# Patient Record
Sex: FEMALE | Race: BLACK OR AFRICAN AMERICAN | Employment: FULL TIME | URBAN - METROPOLITAN AREA
[De-identification: names, ages, dates, MRNs, and addresses within clinical notes are randomized per-mention and may not be internally consistent; named-entity substitution may affect disease eponyms.]

---

## 2022-06-03 ENCOUNTER — HOSPITAL ENCOUNTER (OUTPATIENT)
Dept: PHYSICAL THERAPY | Age: 57
Discharge: HOME | End: 2022-06-03
Payer: MEDICAID

## 2022-06-03 PROCEDURE — 97162 PT EVAL MOD COMPLEX 30 MIN: CPT | Performed by: PHYSICAL THERAPIST
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
## 2022-06-08 ENCOUNTER — HOSPITAL ENCOUNTER (OUTPATIENT)
Dept: PHYSICAL THERAPY | Age: 57
Discharge: HOME | End: 2022-06-08
Payer: MEDICAID

## 2022-06-08 PROCEDURE — 97112 NEUROMUSCULAR REEDUCATION: CPT

## 2022-06-08 PROCEDURE — 97110 THERAPEUTIC EXERCISES: CPT

## 2022-06-08 PROCEDURE — 97530 THERAPEUTIC ACTIVITIES: CPT

## 2022-06-08 PROCEDURE — 97116 GAIT TRAINING THERAPY: CPT
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
## 2022-06-10 ENCOUNTER — HOSPITAL ENCOUNTER (OUTPATIENT)
Dept: PHYSICAL THERAPY | Age: 57
Discharge: HOME | End: 2022-06-10
Payer: MEDICAID

## 2022-06-10 PROCEDURE — 97112 NEUROMUSCULAR REEDUCATION: CPT

## 2022-06-10 PROCEDURE — 97116 GAIT TRAINING THERAPY: CPT

## 2022-06-10 PROCEDURE — 97530 THERAPEUTIC ACTIVITIES: CPT

## 2022-06-10 PROCEDURE — 97110 THERAPEUTIC EXERCISES: CPT

## 2022-06-10 NOTE — PROGRESS NOTES
PT DAILY TREATMENT NOTE     Patient Name: Bahman Decker  Date:6/10/2022  : 1965  [x]  Patient  Verified  Payor: Milli Hernandez / Plan: 1 [facility] 270 / Product Type: Managed Care Medicaid /    In time: 1024   Out time:1020  Total Treatment Time (min): 52  Visit #: 3 of     Treatment Area: Cervical muscle pain [M54.2]  Other low back pain [M54.59]    SUBJECTIVE  Pain Level (0-10 scale): 4/10  Any medication changes, allergies to medications, adverse drug reactions, diagnosis change, or new procedure performed?: [x] No    [] Yes (see summary sheet for update)  Subjective functional status/changes:   [] No changes reported  \" I feel pretty good today\". Pt denies any complications following last session. Notes that she was sore x 1 day \"the normal soreness\".      OBJECTIVE  Modality rationale: PD today    Min Type Additional Details    [] Estim: []Att   []Unatt  []TENS instruct                 []IFC  []Premod []NMES                       []Other:  []w/US   []w/ice   []w/heat  Position:  Location:    []  Traction: [] Cervical       []Lumbar                       [] Prone          []Supine                       []Intermittent   []Continuous Lbs:  [] before manual  [] after manual    []  Ultrasound: []Continuous   [] Pulsed                           []1MHz   []3MHz Location:  W/cm2:    []  Iontophoresis with dexamethasone         Location: [] Take home patch   [] In clinic    []  Ice     []  heat  []  Ice massage Position:  Location:    []  Vasopneumatic Device  If using vaso (only need to measure limb vaso being performed on)      pre-treatment girth :       post-treatment girth :       measured at (landmark location) : Pressure: [] lo [] med [] hi   Temp: [] lo [] med [] hi   [] Skin assessment post-treatment:  []intact []redness- no adverse reaction       []redness - adverse reaction:     10 min Therapeutic Exercise:  Per FS   Rationale: increase ROM, increase strength, improve coordination, improve balance, and increase proprioception to improve the patients ability to progress to functional activities limited by pain or other dysfunction     12 min Therapeutic Activity:  Per FS    Rationale: to improve functional activities, model real life movements and performance specific to the patients need with supervision to return the patient to their PLOF      10 min Neuromuscular Re-education:  Per fs   Rationale: exercises designed to improve and/or maintain balance, coordination, kinesthetic sense, posture, and/or proprioception for functional activities to improve the patients ability to function in daily life    15 min Gait Training:        Increased time sec to slow movement and seated rest breaks , VCing to abdominal draw and posture    50 feet x 2 of following-  [] March            [x] Lateral                   [] Horizontal HT  [] Tandem         [] Banded Lateral     [] Vertical HT  [x] Retrograde/ SBA     [x]  Monster (NO band)   [] Dual Task  [] Hurdles          [] [place]                    [] Ladder Drills  [] Heel Walk      [] Toe Walk   Rationale: improve safety and dynamic movement with household/community ambulation. X throughout treatment min Patient Education: [x] Review HEP , importance of HEP compliance daily to address ROM/mobility (pt reported \"not wanting to overdo it), PT reviewed ROM vs strength and how ROM activity is important to do on regular basis. Other Objective/Functional Measures:     HEP - \"I am doing them now 2x/week\"  Cervical spine: R rotation:60 d ;L rotation: 65 d; R SB:  35d; L SB: 40 d  Progressions: BTB to rows  Additions: TB extensions, palloff press    Pain Level (0-10 scale) post treatment: 4/10 \" I am feeling good\"    ASSESSMENT/Changes in Function:   Pt noted to have good tolerance to activity progressions. 75% v.c. needed for scapular setting w/ UE movements.  Pt is making good progress towards all goals at this time. Demo improved postural awareness w/ sitting, self correcting FHP prior to PT cues this session. Encouraged greater performance of HEP between sessions. Pt verbalized understanding and gratitude for PT tx. Pt deferred modalities this tx. Patient will continue to benefit from skilled PT services to modify and progress therapeutic interventions, address functional mobility deficits, address ROM deficits, address strength deficits, analyze and address soft tissue restrictions, analyze and cue movement patterns, analyze and modify body mechanics/ergonomics, assess and modify postural abnormalities, address imbalance/dizziness, and instruct in home and community integration to attain remaining goals. []  See Plan of Care  []  See progress note/recertification  []  See Discharge Summary         Progress towards goals / Updated goals:  First FU session - will cont per goals as below 6/8/2022    · Short Term Goals: To be accomplished in  2-3  weeks:  1. Patient will be compliant with initial HEP for sx management to address the above listed deficits. Current: 6/10/22: Goal progressing -2x/wk   2. Patient to report pain <=to 5 at worst to facilitate sleep, walking and ADL endurance. Current: 6/10/22: goal progressing; \"getting better\" 5-6/10  · Long Term Goals: To be accomplished in  10-12  treatments:  1. Patient to be independent & compliant with HEP in preparation for D/C.   2. Patient to increase FOTO score to 86 indicating improved functional abilities and QOL. 3. Patient to increase c/s L rot to 60deg, and Arslan SB to 40deg to facilitate driving and indicate improved mm tension. Current: 6/10/22: goal progressing: R rotation:60 d ;L rotation: 65 d; R SB:  35d; L SB: 40 d  4. Patient able to perform a full bridge indicating improved core strength for l/s stability. Current: 6/10/22: 25% at this time, denies pain, \"just weak\"  5.  Patient to increase l/s AROM to 75% globally to facilitate ADLS and functional mobility.        PLAN  [x]  Upgrade activities as tolerated     []  Continue plan of care  []  Update interventions per flow sheet       []  Discharge due to:_  [x]  Other:_  Cont to progress ROM/core stabilization as pt tolerates    The Starr PT 6/10/2022          Future Appointments   Date Time Provider Tessy Al   6/10/2022  9:30 AM [facility]   6/13/2022 10:00 AM [facility] PT [facility]   6/15/2022 10:00 AM [facility] PT [facility]   6/20/2022  7:45 AM Prema ZARATE PT [facility]   6/23/2022 10:00 AM [facility] PT [facility]   6/27/2022  9:15 AM [facility] PT [facility]   6/29/2022  8:30 AM [facility] PT [facility]   9/13/2022 10:30 AM [facility] NURSE [facility]

## 2022-06-13 ENCOUNTER — HOSPITAL ENCOUNTER (OUTPATIENT)
Dept: PHYSICAL THERAPY | Age: 57
Discharge: HOME | End: 2022-06-13
Payer: MEDICAID

## 2022-06-13 PROCEDURE — 97110 THERAPEUTIC EXERCISES: CPT

## 2022-06-13 PROCEDURE — 97530 THERAPEUTIC ACTIVITIES: CPT

## 2022-06-13 PROCEDURE — 97112 NEUROMUSCULAR REEDUCATION: CPT

## 2022-06-13 PROCEDURE — 97116 GAIT TRAINING THERAPY: CPT

## 2022-06-13 NOTE — PROGRESS NOTES
PT DAILY TREATMENT NOTE     Patient Name: Barbaraann Siemens  Date:2022  : 1965  [x]  Patient  Verified  Payor: [employer] / Plan: 1 [facility] 270 / Product Type: Managed Care Medicaid /    In time: 618 Out time:924  Total Treatment Time (min): 54  Visit #: 4 of 10-12    Treatment Area: Cervical muscle pain [M54.2]  Other low back pain [M54.59]    SUBJECTIVE  Pain Level (0-10 scale): 510  Any medication changes, allergies to medications, adverse drug reactions, diagnosis change, or new procedure performed?: [x] No    [] Yes (see summary sheet for update)  Subjective functional status/changes:   [] No changes reported  Patient reports doing well     OBJECTIVE  Modality rationale: PD today    Min Type Additional Details    [] Estim: []Att   []Unatt  []TENS instruct                 []IFC  []Premod []NMES                       []Other:  []w/US   []w/ice   []w/heat  Position:  Location:    []  Traction: [] Cervical       []Lumbar                       [] Prone          []Supine                       []Intermittent   []Continuous Lbs:  [] before manual  [] after manual    []  Ultrasound: []Continuous   [] Pulsed                           []1MHz   []3MHz Location:  W/cm2:    []  Iontophoresis with dexamethasone         Location: [] Take home patch   [] In clinic    []  Ice     []  heat  []  Ice massage Position:  Location:    []  Vasopneumatic Device  If using vaso (only need to measure limb vaso being performed on)      pre-treatment girth :       post-treatment girth :       measured at (landmark location) : Pressure: [] lo [] med [] hi   Temp: [] lo [] med [] hi   [x] Skin assessment post-treatment:  [x]intact []redness- no adverse reaction       []redness - adverse reaction:     20 min Therapeutic Exercise:  Per FS   Rationale: increase ROM, increase strength, improve coordination, improve balance, and increase proprioception to improve the patients ability to progress to functional activities limited by pain or other dysfunction     16 min Therapeutic Activity:  Per FS    Rationale: to improve functional activities, model real life movements and performance specific to the patients need with supervision to return the patient to their PLOF      10 min Neuromuscular Re-education:  Per FS   Rationale: exercises designed to improve and/or maintain balance, coordination, kinesthetic sense, posture, and/or proprioception for functional activities to improve the patients ability to function in daily life    8 min Gait Training:        Increased time sec to slow movement and seated rest breaks , VCing to abdominal draw and posture     50 feet x 2 of following-  [x] March            [x] Lateral                   [] Horizontal HT  [] Tandem         [] Banded Lateral     [] Vertical HT  [x] Retrograde/ SBA     [x]  Monster (NO band)   [] Dual Task  [] Hurdles          [] Fransisca Littler                    [] Ladder Drills  [] Heel Walk      [] Toe Walk   Rationale: improve safety and dynamic movement with household/community ambulation. X throughout treatment min Patient Education: [x] Review HEP , bridge, TB B shoulder ER with GTB       Other Objective/Functional Measures:     LS AROM : flexion - mid shin , extension - dec 25%, SB - dec 25% B (proximal knee joint)  TA  Progression - added march to TA draw  Bridge - dec 75%    GH/ posture progression - supine TB ER     Pain Level (0-10 scale) post treatment: 4 \"just my normal pain\"    ASSESSMENT/Changes in Function:   Pt demo slight improvement in LS AROM compared to IE.  LS lordosis is still prevalent with decreased reversal.  Patient did not require UE to return to standing after LS flexion indicating increased LS strength. Significant strength deficits of posterior chain strength as indicated by poor glut bridge AROM.   Patient progressing well overlal        Patient will continue to benefit from skilled PT services to modify and progress therapeutic interventions, address functional mobility deficits, address ROM deficits, address strength deficits, analyze and address soft tissue restrictions, analyze and cue movement patterns, analyze and modify body mechanics/ergonomics, assess and modify postural abnormalities, address imbalance/dizziness, and instruct in home and community integration to attain remaining goals. [x]  See Plan of Care  []  See progress note/recertification  []  See Discharge Summary         Progress towards goals / Updated goals:  PN due 7/3  First FU session - will cont per goals as below 6/8/2022    · Short Term Goals: To be accomplished in  2-3  weeks:  1. Patient will be compliant with initial HEP for sx management to address the above listed deficits. Current: 6/10/22: Goal progressing -2x/wk      2. Patient to report pain <=to 5 at worst to facilitate sleep, walking and ADL endurance. Current: 6/10/22: goal progressing; \"getting better\" 5-6/10    · Long Term Goals: To be accomplished in  10-12  treatments:  1. Patient to be independent & compliant with HEP in preparation for D/C.   2. Patient to increase FOTO score to 86 indicating improved functional abilities and QOL. 3. Patient to increase c/s L rot to 60deg, and Arslan SB to 40deg to facilitate driving and indicate improved mm tension. Current: 6/10/22: goal progressing: R rotation:60 d ;L rotation: 65 d; R SB:  35d; L SB: 40 d    4. Patient able to perform a full bridge indicating improved core strength for l/s stability. Current: 6/10/22: 25% at this time, denies pain, \"just weak\"    5. Patient to increase l/s AROM to 75% globally to facilitate ADLS and functional mobility.    Current:  Goal progressing well - improvements noted in SB and extension since IE.  6/13/2022      PLAN  [x]  Upgrade activities as tolerated     []  Continue plan of care  []  Update interventions per flow sheet       []  Discharge due to:_  [x]  Other:_ cont core strength Deidra Verde, PT 6/13/2022          Future Appointments   Date Time Provider Tessy Al   6/13/2022  8:30 AM Kaya Muhammad, PT [facility]   6/15/2022 10:00 AM [facility] PT [place] 2 [facility]   6/20/2022  7:45 AM Rina ZARATE, PT [facility]   6/23/2022 10:00 AM [facility] PT [place] 2 [facility]   6/27/2022  9:15 AM [facility] PT [place] 2 [facility]   6/29/2022  8:30 AM [facility] PT [place] 2 [facility]   9/13/2022 10:30 AM [facility] NUR[facility] BS AMB

## 2022-06-15 ENCOUNTER — HOSPITAL ENCOUNTER (OUTPATIENT)
Dept: PHYSICAL THERAPY | Age: 57
Discharge: HOME | End: 2022-06-15
Payer: MEDICAID

## 2022-06-15 PROCEDURE — 97112 NEUROMUSCULAR REEDUCATION: CPT

## 2022-06-15 PROCEDURE — 97116 GAIT TRAINING THERAPY: CPT

## 2022-06-15 PROCEDURE — 97530 THERAPEUTIC ACTIVITIES: CPT

## 2022-06-15 PROCEDURE — 97110 THERAPEUTIC EXERCISES: CPT

## 2022-06-15 NOTE — PROGRESS NOTES
PT DAILY TREATMENT NOTE     Patient Name: Izora Osgood  Date:6/15/2022  : 1965  [x]  Patient  Verified  Payor: Yogesh Robbdilcia / Plan: 1 [place] [id number] / Product Type: Managed Care Medicaid /    In time: 1000 Out time:1051  Total Treatment Time (min): 51  Visit #: 5 of 10-12    Treatment Area: Cervical muscle pain [M54.2]  Other low back pain [M54.59]    SUBJECTIVE  Pain Level (0-10 scale): 4/10 t/s and l/s pain, 6/10 R great toe pain  Any medication changes, allergies to medications, adverse drug reactions, diagnosis change, or new procedure performed?: [x] No    [] Yes (see summary sheet for update)  Subjective functional status/changes:   [] No changes reported  Pt reports that she has been doing well, no problems w/ the last session. However, noted after the last session in the evening, had increased R great toe pain, difficulty w/ mobility of that toe and some mild swelling. Pt has used ice prn and took Tylenol prior to therapy to reduce overall S&S. Noted cont soreness but improved ability to walk. However, notes that back and upper back pain are improving. Pt denies any changes to activity, h/o Gout, or dropping anything on her toe.     OBJECTIVE  Modality rationale: PD today    Min Type Additional Details    [] Estim: []Att   []Unatt  []TENS instruct                 []IFC  []Premod []NMES                       []Other:  []w/US   []w/ice   []w/heat  Position:  Location:    []  Traction: [] Cervical       []Lumbar                       [] Prone          []Supine                       []Intermittent   []Continuous Lbs:  [] before manual  [] after manual    []  Ultrasound: []Continuous   [] Pulsed                           []1MHz   []3MHz Location:  W/cm2:    []  Iontophoresis with dexamethasone         Location: [] Take home patch   [] In clinic    []  Ice     []  heat  []  Ice massage Position:  Location:    []  Vasopneumatic Device  If using vaso (only need to measure limb vaso being performed on)      pre-treatment girth :       post-treatment girth :       measured at (landmark location) : Pressure: [] lo [] med [] hi   Temp: [] lo [] med [] hi   [] Skin assessment post-treatment:  []intact []redness- no adverse reaction       []redness - adverse reaction:     20 min Therapeutic Exercise:  Per FS   Rationale: increase ROM, increase strength, improve coordination, improve balance, and increase proprioception to improve the patients ability to progress to functional activities limited by pain or other dysfunction     11 min Therapeutic Activity:  Per FS    Rationale: to improve functional activities, model real life movements and performance specific to the patients need with supervision to return the patient to their PLOF      10 min Neuromuscular Re-education:  Per FS   Rationale: exercises designed to improve and/or maintain balance, coordination, kinesthetic sense, posture, and/or proprioception for functional activities to improve the patients ability to function in daily life    10 min Gait Training:        Increased time sec to slow movement and seated rest breaks , VCing to abdominal draw and posture     50 feet x 2 of following-  [x] March            [x] Lateral                   [] Horizontal HT  [] Tandem         [] Banded Lateral     [] Vertical HT  [] Retrograde/ SBA     [x]  Monster (NO band)   [] Dual Task  [] Hurdles          [x] Parisa Siddiqi                    [] Ladder Drills  [] Heel Walk      [] Toe Walk   Rationale: improve safety and dynamic movement with household/community ambulation.             X throughout treatment min Patient Education: [x] Review HEP, controlled breathing w/ bridging activity     Other Objective/Functional Measures:   + noted R great toe pain today that started the evening of the last session, unable to recall any pain/discomfort during the session and denies any trauma/h/o pain or gout  + agreeable to current program. PT closely monitoring for any adverse response to exercises in foot or back/t/s  + added: jamaljl tolentino, seated SB 3-way for l/s and t/s ROM  + pt noted to maintain breath holds during lift portion of bridge, reviewed breathing out w/ effort. Pt noted improved ease of movement w/ breathing technique  + progressed to 2x10 TrA hold w/ marching  Great toe:   Circumference: R: 8.0 cm   L:  7.5 cm (base of great toe)         R:   21.5 cm             L: 21.5 cm (forefoot)   Flex: R: 20 d w/ pain dorsal aspect of foot  L:45 d   Ext: R:   45 d      L:  50 d  + NO CHANGES IN COLOR OR TEMPERATURE OF THE RIGHT GREAT TOE/FOREFOOT    Pain Level (0-10 scale) post treatment: 3/10 R great toe, 4/10 LB and t/s \" My back is feeling good and my toe is better\"     ASSESSMENT/Changes in Function:   Pt noted to have good tolerance to activities performed during session today without any increased S&S. Held retro walking to avoid increased ext at the R great toe w/ toe to heel sequence during this task. Noted to require intermittent v.c. for breathing throughout exercises and for TrA bracing. Pt w/ good recall w/ therapists cueing, of correct activity performance. Noted to have cont weakness in the core and proximal hips and would benefit from cont progression of activity as appropriate. Of note, pt w/ R great toe pain w/ increased base of great toe circumference on the R as well as decreased AROM of the R toe w/ pain into flex. No h/o gout or trauma or pain. Will cont to monitor. Pt denies any knee pain any other location.        Patient will continue to benefit from skilled PT services to modify and progress therapeutic interventions, address functional mobility deficits, address ROM deficits, address strength deficits, analyze and address soft tissue restrictions, analyze and cue movement patterns, analyze and modify body mechanics/ergonomics, assess and modify postural abnormalities, address imbalance/dizziness, and instruct in home and community integration to attain remaining goals. [x]  See Plan of Care  []  See progress note/recertification  []  See Discharge Summary         Progress towards goals / Updated goals:  PN due 7/3  First FU session - will cont per goals as below 6/8/2022    · Short Term Goals: To be accomplished in  2-3  weeks:  1. Patient will be compliant with initial HEP for sx management to address the above listed deficits. Current: 6/10/22: Goal progressing -2x/wk      2. Patient to report pain <=to 5 at worst to facilitate sleep, walking and ADL endurance. Current: 6/10/22: goal progressing; \"getting better\" 5-6/10    · Long Term Goals: To be accomplished in  10-12  treatments:  1. Patient to be independent & compliant with HEP in preparation for D/C.   2. Patient to increase FOTO score to 86 indicating improved functional abilities and QOL. 3. Patient to increase c/s L rot to 60deg, and Arslan SB to 40deg to facilitate driving and indicate improved mm tension. Current: 6/10/22: goal progressing: R rotation:60 d ;L rotation: 65 d; R SB:  35d; L SB: 40 d    4. Patient able to perform a full bridge indicating improved core strength for l/s stability. Current: 6/10/22: 25% at this time, denies pain, \"just weak\"    5. Patient to increase l/s AROM to 75% globally to facilitate ADLS and functional mobility.    Current:  Goal progressing well - improvements noted in SB and extension since IE.  6/13/2022      PLAN  [x]  Upgrade activities as tolerated     []  Continue plan of care  []  Update interventions per flow sheet       []  Discharge due to:_  [x]  Other:_ cont core strength, f/u on R great toe pain (measurements) 2/2 pt without h/o gout but recent onset pain in the R great toe    The Starr, PT 6/15/2022          Future Appointments   Date Time Provider Tessy Al   6/15/2022 10:00 AM [facility] PT [place] [facility]   6/20/2022  7:45 AM Rocío Cm PT [facility]   6/23/2022 10:00 AM [facility] PT [place] 2 [facility] 6/27/2022  9:15 AM [facility] PT [place] 2 [facility] [id number]   6/29/2022  8:30 AM [facility] PT [facility] [facility]   9/13/2022 10:30 AM [facility] NURSE [facility] BS AMB

## 2022-06-20 ENCOUNTER — HOSPITAL ENCOUNTER (OUTPATIENT)
Dept: PHYSICAL THERAPY | Age: 57
Discharge: HOME | End: 2022-06-20
Payer: MEDICAID

## 2022-06-20 PROCEDURE — 97110 THERAPEUTIC EXERCISES: CPT

## 2022-06-20 PROCEDURE — 97112 NEUROMUSCULAR REEDUCATION: CPT

## 2022-06-20 PROCEDURE — 97116 GAIT TRAINING THERAPY: CPT

## 2022-06-20 PROCEDURE — 97530 THERAPEUTIC ACTIVITIES: CPT

## 2022-06-20 NOTE — PROGRESS NOTES
PT DAILY TREATMENT NOTE     Patient Name: Bibiana Bolaños  Date:2022  : 1965  [x]  Patient  Verified  Payor: Roland Sigala / Plan: 1 [facility] 270 / Product Type: Managed Care Medicaid /    In time: 7:43 Out time:8:33  Total Treatment Time (min): 50  Visit #: 6 of 10-12    Treatment Area: Cervical muscle pain [M54.2]  Other low back pain [M54.59]    SUBJECTIVE  Pain Level (0-10 scale): 4 spine  Any medication changes, allergies to medications, adverse drug reactions, diagnosis change, or new procedure performed?: [x] No    [] Yes (see summary sheet for update)  Subjective functional status/changes:   [] No changes reported  \"The toe is much better. I'm doing movement (demo's toe flexion) and using ice. Now I can walk comfortably and have no major issues. Doesn't look swollen; looks like it's gone down. My back is moving better\"  Notes improved CS AROM rotation for driving. Pt notes \"great HA\" lately. No major issues. OBJECTIVE      15 min Therapeutic Exercise:  Per FS  inCreased NuStep to level 2 (assess while on NuStep)  Increased tband rows and extensions to black  's pose done in standing with GTB   Rationale: increase ROM, increase strength, improve coordination, improve balance, and increase proprioception to improve the patients ability to progress to functional activities limited by pain or other dysfunction     15 min Therapeutic Activity:  Per FS     increased bridges to 2x12    Rationale: to improve functional activities, model real life movements and performance specific to the patients need with supervision to return the patient to their PLOF      12 min Neuromuscular Re-education:  Per FS    Increased palloff press to double BTB  TA march changed to dead bugs with opposite UE.       Rationale: exercises designed to improve and/or maintain balance, coordination, kinesthetic sense, posture, and/or proprioception for functional activities to improve the patients ability to function in daily life    8 min Gait Training:        Increased time sec to slow movement and seated rest breaks , VCing to abdominal draw and posture     50 feet x 2 of following-  [x] March            [x] Lateral                   [] Horizontal HT  [] Tandem         [] Banded Lateral     [] Vertical HT  [] Retrograde/ SBA     [x]  Monster (NO band)   [] Dual Task  [] Hurdles          [x] Terra Dolphin                    [] Ladder Drills  [] Heel Walk      [] Toe Walk   Rationale: improve safety and dynamic movement with household/community ambulation. X throughout treatment min Patient Education: [x] Review HEP    Discussed DOMS     Other Objective/Functional Measures:     Great toe:   Circumference: R: 7.5 cm   L:  7.5 cm (base of great toe)         R:   21.5 cm             L: 21.5 cm (forefoot)   Flex: R: 40 d w/ pain (min) dorsal aspect of foot  L:45 d   Ext: R:   80 d (PROM)      L:  50 d  + NO CHANGES IN COLOR OR TEMPERATURE OF THE RIGHT GREAT TOE/FOREFOOT    CS AROm: L rotation 65 deg, R Rotation 60 deg, LSB 44, RSB  46 deg   Lumbar AROM: flexion to mid shins; (B) SB 75%, extension 75%, (B) rotation in standing 75% pain free  Bridge ROM:  con't at 25% with fatigue    Pain Level (0-10 scale) post treatment: 2    ASSESSMENT/Changes in Function:   Pt has normalized girth measurements in the R toe and has full PROM flexion/extension with only min c/o dorsal foot pain with PROM end range flexion. Pt with no c/o R hallux pain during today's treatment and progression of TE. Co'nt to be compliant with use of ice. Pt has maintained her increase in CS AROM rotation and has increased SB (B). She has con't gains in lumbar SB and rotation. Weakness in the R LE with gait training today (pt notes this is a previous limitations). Pt notes ability to feel mm activation during tband extensions and palloff press. Reviewed pt's progress towards her goals and discussed next appt on 6/23. Patient will continue to benefit from skilled PT services to modify and progress therapeutic interventions, address functional mobility deficits, address ROM deficits, address strength deficits, analyze and address soft tissue restrictions, analyze and cue movement patterns, analyze and modify body mechanics/ergonomics, assess and modify postural abnormalities, address imbalance/dizziness, and instruct in home and community integration to attain remaining goals. [x]  See Plan of Care  []  See progress note/recertification  []  See Discharge Summary         Progress towards goals / Updated goals:  PN due 7/3  · Short Term Goals: To be accomplished in  2-3  weeks:  1. Patient will be compliant with initial HEP for sx management to address the above listed deficits. Current: 6/20/22: Goal progressing -2x/wk      2. Patient to report pain <=to 5 at worst to facilitate sleep, walking and ADL endurance. Current: goal progressing; \"getting better\" 4/10 ave pain recently (6/20/22)    · Long Term Goals: To be accomplished in  10-12  treatments:  1. Patient to be independent & compliant with HEP in preparation for D/C.   2. Patient to increase FOTO score to 86 indicating improved functional abilities and QOL. 3. Patient to increase c/s L rot to 60deg, and Arslan SB to 40deg to facilitate driving and indicate improved mm tension. Current: goal progressing: R rotation:60 d ;L rotation: 65 d; R SB:  46d; L SB: 44 d (6/20/22)  4. Patient able to perform a full bridge indicating improved core strength for l/s stability. Current:25% at this time, denies pain, \"just weak\" (6/20/22)  5. Patient to increase l/s AROM to 75% globally to facilitate ADLS and functional mobility.    Current:  flexion to mid shins; (B) SB 75%, extension 75%, (B) rotation in standing 75% pain free (6/20/22)    PLAN  [x]  Upgrade activities as tolerated     []  Continue plan of care  []  Update interventions per flow sheet       []  Discharge due to:_  [x]  Other:_ con't to progress program; assess great toe as needed if any flare-up in pain     Raquel Denise, PT 6/20/2022          Future Appointments   Date Time Provider Tessy Al   6/20/2022  7:45 AM Sukhwinder Wallace, PT [facility]   6/23/2022 10:00 AM [facility] PT [place] 2 [facility]   6/27/2022  9:15 AM [facility] PT [place] 2 [facility]   6/29/2022  8:30 AM [facility] PT [place] 3 [facility]   9/13/2022 10:30 AM [facility] NURSE [facility] BS AMB

## 2022-06-23 ENCOUNTER — HOSPITAL ENCOUNTER (OUTPATIENT)
Dept: PHYSICAL THERAPY | Age: 57
Discharge: HOME | End: 2022-06-23
Payer: MEDICAID

## 2022-06-23 PROCEDURE — 97530 THERAPEUTIC ACTIVITIES: CPT

## 2022-06-23 PROCEDURE — 97116 GAIT TRAINING THERAPY: CPT

## 2022-06-23 PROCEDURE — 97110 THERAPEUTIC EXERCISES: CPT

## 2022-06-23 PROCEDURE — 97112 NEUROMUSCULAR REEDUCATION: CPT

## 2022-06-23 NOTE — PROGRESS NOTES
PT DAILY TREATMENT NOTE     Patient Name: Sonu Issa  Date:2022  : 1965  [x]  Patient  Verified  Payor: 100 [place],9D / Plan: 1 [place] [id number] / Product Type: Managed Care Medicaid /    In time: 1003 Out time:1053   Total Treatment Time (min): 50  Visit #: 7 of 10-12    Treatment Area: Cervical muscle pain [M54.2]  Other low back pain [M54.59]    SUBJECTIVE  Pain Level (0-10 scale): 4.5/10 in the l.s, pt reports \" I feel so stiff today\"  Any medication changes, allergies to medications, adverse drug reactions, diagnosis change, or new procedure performed?: [x] No    [] Yes (see summary sheet for update)  Subjective functional status/changes:   [] No changes reported  Pt reports that her great toe cont to feel good.      OBJECTIVE    15 min Therapeutic Exercise:    Rationale: increase ROM, increase strength, improve coordination, improve balance, and increase proprioception to improve the patients ability to progress to functional activities limited by pain or other dysfunction     15 min Therapeutic Activity:  Per FS      Rationale: to improve functional activities, model real life movements and performance specific to the patients need with supervision to return the patient to their PLOF      13 min Neuromuscular Re-education:  Per FS     Rationale: exercises designed to improve and/or maintain balance, coordination, kinesthetic sense, posture, and/or proprioception for functional activities to improve the patients ability to function in daily life    10 min Gait Training:        Increased time sec to slow movement and seated rest breaks , VCing to abdominal draw and posture     50 feet x 2 of following-  [x] March            [] Lateral                    [] Horizontal HT  [] Tandem         [x] Banded Lateral (YTB)    [] Vertical HT  [x] Retrograde     [x]  Monster (YTB)   [] Dual Task  [] Hurdles          [x] Donah Smiley                    [] Ladder Drills  [] Heel Walk      [] Toe Walk   Rationale: improve safety and dynamic movement with household/community ambulation. X throughout treatment min Patient Education: [x] Review HEP, [place] is 6/27/22 at 0915     Other Objective/Functional Measures:     + progressed w/ addition of open books and waiters to Hyperpot toe: (did not assess today 2/2 no pain or flare of S&S)   Circumference: R: 7.5 cm   L:  7.5 cm (base of great toe)         R:   21.5 cm             L: 21.5 cm (forefoot)   Flex: R: 40 d w/ pain (min) dorsal aspect of foot  L:45 d   Ext: R:   80 d (PROM)      L:  50 d  + NO CHANGES IN COLOR OR TEMPERATURE OF THE RIGHT GREAT TOE/FOREFOOT    Pain Level (0-10 scale) post treatment: 4/10 \" I feel much better\"    ASSESSMENT/Changes in Function:   Pt noted to have no complaints of R great toe pain, therefore, did not address the R toe at this session. Pt noted to have \"stiffness\" in the mid to lower back, that was addressed w/ stretching of the LEs and t/s and l/s as pt tolerated. Pt noted reduced feeling of \"stiffness\" post activity. Cont to demo core and hip weakness w/ intermittent need for rest breaks throughout activity as needed. Will cont to progress pt as she tolerates. Patient will continue to benefit from skilled PT services to modify and progress therapeutic interventions, address functional mobility deficits, address ROM deficits, address strength deficits, analyze and address soft tissue restrictions, analyze and cue movement patterns, analyze and modify body mechanics/ergonomics, assess and modify postural abnormalities, address imbalance/dizziness, and instruct in home and community integration to attain remaining goals. [x]  See Plan of Care  []  See progress note/recertification  []  See Discharge Summary         Progress towards goals / Updated goals:  PN due 7/3  · Short Term Goals: To be accomplished in  2-3  weeks:  1.  Patient will be compliant with initial HEP for sx management to address the above listed deficits. Current: 6/20/22: Goal progressing -2x/wk      2. Patient to report pain <=to 5 at worst to facilitate sleep, walking and ADL endurance. Current: goal progressing; \"getting better\" 4/10 ave pain recently (6/20/22)    · Long Term Goals: To be accomplished in  10-12  treatments:  1. Patient to be independent & compliant with HEP in preparation for D/C.   2. Patient to increase FOTO score to 86 indicating improved functional abilities and QOL. 3. Patient to increase c/s L rot to 60deg, and Arslan SB to 40deg to facilitate driving and indicate improved mm tension. Current: goal progressing: R rotation:60 d ;L rotation: 65 d; R SB:  46d; L SB: 44 d (6/20/22)  4. Patient able to perform a full bridge indicating improved core strength for l/s stability. Current:25% at this time, denies pain, \"just weak\" (6/20/22)  5. Patient to increase l/s AROM to 75% globally to facilitate ADLS and functional mobility.    Current:  flexion to mid shins; (B) SB 75%, extension 75%, (B) rotation in standing 75% pain free (6/20/22)    PLAN  [x]  Upgrade activities as tolerated     []  Continue plan of care  []  Update interventions per flow sheet       []  Discharge due to:_  [x]  Other:_ con't to progress pt as tolerated; assess great toe as needed if any flare-up in pain     [facility] 6/23/2022          Future Appointments   Date Time Provider Tessy Al   6/23/2022 10:00 AM [address] 2 [facility]   6/27/2022  9:15 AM [address] 1 [facility]   6/29/2022  8:30 AM Leticia Cash PT [facility]   9/13/2022 10:30 AM HR [facility] NURSE [facility] BS AMB

## 2022-06-27 ENCOUNTER — HOSPITAL ENCOUNTER (OUTPATIENT)
Dept: PHYSICAL THERAPY | Age: 57
Discharge: HOME | End: 2022-06-27
Payer: MEDICAID

## 2022-06-27 PROCEDURE — 97530 THERAPEUTIC ACTIVITIES: CPT

## 2022-06-27 PROCEDURE — 97110 THERAPEUTIC EXERCISES: CPT

## 2022-06-27 PROCEDURE — 97112 NEUROMUSCULAR REEDUCATION: CPT

## 2022-06-27 PROCEDURE — 97116 GAIT TRAINING THERAPY: CPT

## 2022-06-27 NOTE — PROGRESS NOTES
PT DAILY TREATMENT NOTE     Patient Name: Angelica Javed  Date:2022  : 1965  [x]  Patient  Verified  Payor: [employer]ICAID / Plan: 1 [facility] / Product Type: Managed Care Medicaid /    In time: 0747 am Out time:1010 am  Total Treatment Time (min): 55  Visit #: 8 of 10-12    Treatment Area: Cervical muscle pain [M54.2]  Other low back pain [M54.59]    SUBJECTIVE  Pain Level (0-10 scale): 4.5/10  Any medication changes, allergies to medications, adverse drug reactions, diagnosis change, or new procedure performed?: [x] No    [] Yes (see summary sheet for update)  Subjective functional status/changes:   [] No changes reported  Pt reports her big toe is doing much better   As per patient \"same old\"; patient reports low back pain causing LE stiffness     OBJECTIVE    15 min Therapeutic Exercise: see flow sheet    Rationale: increase ROM, increase strength, improve coordination, improve balance, and increase proprioception to improve the patients ability to progress to functional activities limited by pain or other dysfunction     10 min Therapeutic Activity: see flow sheet      Rationale: to improve functional activities, model real life movements and performance specific to the patients need with supervision to return the patient to their PLOF      15 min Neuromuscular Re-education: see flow sheet      Rationale: exercises designed to improve and/or maintain balance, coordination, kinesthetic sense, posture, and/or proprioception for functional activities to improve the patients ability to function in daily life    15 min Gait Trainin-60 feet x 2 of following-  [x] March  (RTB)          [] Lateral                    [] Horizontal HT  [] Tandem         [x] Banded Lateral (RTB)    [] Vertical HT  [x] Retrograde (RTB)    [x]  Monster (RTB)   [] Dual Task  [] Hurdles          [x] Barbarann Grieves                    [] Ladder Drills  [] Heel Walk      [] Toe Walk Rationale: improve safety and dynamic movement with household/community ambulation. X throughout treatment min Patient Education: [x] Review HEP: dead bug form     Other Objective/Functional Measures:   Progressed: band resistance for gait activities, repetitions for standing bilat [facility] ER    Pain Level (0-10 scale) post treatment: 0    ASSESSMENT/Changes in Function:   Pt demonstrates good carryover, requiring minimal cuing for proper form throughout session when performing exercises; as per patient she is compliant with HEP \"if not every day, every other day\". She tolerated progressed therex without any increased pain or discomfort with decreased stiffness reported post PT session. Will plan to assess patient's goals next visit and progress patient as able to return to PLOF and maximize patient's functional status and independence. Patient will continue to benefit from skilled PT services to modify and progress therapeutic interventions, address functional mobility deficits, address ROM deficits, address strength deficits, analyze and address soft tissue restrictions, analyze and cue movement patterns, analyze and modify body mechanics/ergonomics, assess and modify postural abnormalities, address imbalance/dizziness, and instruct in home and community integration to attain remaining goals. [x]  See Plan of Care  []  See progress note/recertification  []  See Discharge Summary         Progress towards goals / Updated goals:  PN due 7/3  · Short Term Goals: To be accomplished in  2-3  weeks:  1. Patient will be compliant with initial HEP for sx management to address the above listed deficits. Current: 6/20/22: Goal progressing -2x/wk      2. Patient to report pain <=to 5 at worst to facilitate sleep, walking and ADL endurance. Current: goal progressing; \"getting better\" 4/10 ave pain recently (6/20/22)    · Long Term Goals: To be accomplished in  10-12  treatments:  1.  Patient to be independent & compliant with HEP in preparation for D/C.   2. Patient to increase FOTO score to 86 indicating improved functional abilities and QOL. 3. Patient to increase c/s L rot to 60deg, and Arslan SB to 40deg to facilitate driving and indicate improved mm tension. Current: goal progressing: R rotation:60 d ;L rotation: 65 d; R SB:  46d; L SB: 44 d (6/20/22)  4. Patient able to perform a full bridge indicating improved core strength for l/s stability. Current:25% at this time, denies pain, \"just weak\" (6/20/22)  5. Patient to increase l/s AROM to 75% globally to facilitate ADLS and functional mobility.    Current:  flexion to mid shins; (B) SB 75%, extension 75%, (B) rotation in standing 75% pain free (6/20/22)    PLAN  [x]  Upgrade activities as tolerated     []  Continue plan of care  []  Update interventions per flow sheet       []  Discharge due to:_  [x]  Other: Progress note next visit     Toma Sales PT 6/27/2022  10:52 AM         Future Appointments   Date Time Provider Tessy Al   6/29/2022  8:30 AM Joycelyn Watson, PT [facility]   9/13/2022 10:30 AM HR [facility] NURSE [facility] BS AMB

## 2022-06-29 ENCOUNTER — HOSPITAL ENCOUNTER (OUTPATIENT)
Dept: PHYSICAL THERAPY | Age: 57
Discharge: HOME | End: 2022-06-29
Payer: MEDICAID

## 2022-06-29 PROCEDURE — 97116 GAIT TRAINING THERAPY: CPT

## 2022-06-29 PROCEDURE — 97112 NEUROMUSCULAR REEDUCATION: CPT

## 2022-06-29 PROCEDURE — 97530 THERAPEUTIC ACTIVITIES: CPT

## 2022-06-29 PROCEDURE — 97110 THERAPEUTIC EXERCISES: CPT

## 2022-06-29 NOTE — PROGRESS NOTES
PT DAILY TREATMENT NOTE     Patient Name: Mushtaq Bautista  Date:2022  : 1965  [x]  Patient  Verified  Payor: 100 [place],9D / Plan: 1 [facility] 270 / Product Type: Managed Care Medicaid /    In time: 933 Out time: 938  Total Treatment Time (min): 60  Visit #: 9 of 10-12    Treatment Area: Cervical muscle pain [M54.2]  Other low back pain [M54.59]    SUBJECTIVE  Pain Level (0-10 scale): 10  Any medication changes, allergies to medications, adverse drug reactions, diagnosis change, or new procedure performed?: [x] No    [] Yes (see summary sheet for update)  Subjective functional status/changes:   [] No changes reported  Patient reports feeling good, pain is the same in \"that area, this is my normal\" pointing to low back. \"My body is getting used to the activity of physical therapy, I was good when I left Monday. \" She reports \"I do my exercises at night\".        OBJECTIVE    22 min Therapeutic Exercise: see flow sheet   Initiated paloff press with rotation for functional core mobility and strengthening   Rationale: increase ROM, increase strength, improve coordination, improve balance, and increase proprioception to improve the patients ability to progress to functional activities limited by  pain or other dysfunction     20 min Therapeutic Activity: see flow sheet, re-assessment, review of FOTO w/ pt, review of goals, progress made, intent of cont therapy, current deficits     Rationale: to improve functional activities, model real life movements and performance specific to the patients need with supervision to return the patient to their PLOF      10 min Neuromuscular Re-education: see flow sheet      Rationale: exercises designed to improve and/or maintain balance, coordination, kinesthetic sense, posture, and/or proprioception for functional activities to improve the patients ability to function in daily life    8 min Gait Trainin-60 feet x 2 of following-  [] March  (RTB)          [] Lateral                    [] Horizontal HT  [] Tandem         [x] Banded Lateral (RTB)    [] Vertical HT  [] Retrograde (RTB)    [x]  Monster (RTB)   [] Dual Task  [] Hurdles          [] Barbarann Grieves                    [] Ladder Drills  [] Heel Walk      [] Toe Walk   Rationale: improve safety and dynamic movement with household/community ambulation. X throughout treatment min Patient Education: [x] Review HEP - continue with current  HEP, incorporate open book stretch in bed prior to sitting up to address c/o of morning tightness, use of MHP on cervical/shoulders at home for tissue extensibility and relaxation     Other Objective/Functional Measures:   Pt demo's improving kinesthetic awareness, able to decr UT recruitment with cues for scap retraction. Added paloff press with rotation, lateral steps with MB held at chest at scap retraction endurance. Good endurance exhibited, able to tolerate 10' on Nustep at incr resistance level. Goals assessed for PN    Subjective % improvement: \"Atleast 50%\"   Pain: Worst: 4/10 Best: 0/10  Av/10   Improvements: \"stamina, endurance, mobility\"  Deficits: \"Forward flexion, hip flexion, transfers\"     FOTO: 63 (decreased from initial 76 d/t poor intake)  Posture: increased l/s lordosis, Fwd head posture  L/S AROM: Flexion fingers to mid shin (able to rise without use of UE, reverse lordosis absent) extension 50% deg with pain , LSB 50% deg, RSB 50% (sx in L l/s) . Rot: R: 100%, L: 100%   C/S AROM: Flex full, ext full pain-free, R lat flex: 33deg, L lat flex 38deg, R rot 68deg, L rot 62deg. Strength: B hip flexion 5/5  Palpation reveals:  Hypertonicity Arslan  UT and t/s (R>L). TTP at B QL and l/s ps mm.   Flexibility: HS length 165deg B  SLR: x10 B 25deg \"medium to hard\" (L>R) no pain at SIJ      Pain Level (0-10 scale) post treatment: 0    ASSESSMENT/Changes in Function:   Refer to PN    Patient will continue to benefit from skilled PT services to modify and progress therapeutic interventions, address functional mobility deficits, address ROM deficits, address strength deficits, analyze and address soft tissue restrictions, analyze and cue movement patterns, analyze and modify body mechanics/ergonomics, assess and modify postural abnormalities, address imbalance/dizziness, and instruct in home and community integration to attain remaining goals. []  See Plan of Care  [x]  See progress note/recertification  []  See Discharge Summary         Progress towards goals / Updated goals: · Short Term Goals: To be accomplished in  2-3  weeks:  1. Patient will be compliant with initial HEP for sx management to address the above listed deficits. Current: 6/29/22: goal met; At least 3x/week, pt verbalizes importance of self management  2. Patient to report pain <=to 5 at worst to facilitate sleep, walking and ADL endurance. Current: 6/29/22: goal met; \"getting better\" 4/10 ave pain recently    · Long Term Goals: To be accomplished in  10-12  treatments:  1. Patient to be independent & compliant with HEP in preparation for D/C. Current: 6/29/22: goal progressing; At least 3x/week, pt verbalizes importance of self management  2. Patient to increase FOTO score to 86 indicating improved functional abilities and QOL. Status at assessment: 76  Current: 6/29/22: 63 (decreased from initial 76 d/t poor intake)  Goal d/c, not appropriate d/t poor intake score   3. Patient to increase c/s L rot to 60deg, and Arslan SB to 40deg to facilitate driving and indicate improved mm tension. Status at assessment: flex: 1 finger to manubriub , ext full (tightness in ant neck mm), R lat flex: 32, L lat flex 32deg, R rot 80, L rot 46deg. Current: 6/29/22: goal progressing: Flex full, ext full pain-free, R lat flex: 33deg, L lat flex 38deg, R rot 68deg, L rot 62deg. 4. Patient able to perform a full bridge indicating improved core strength for l/s stability.   Status at assessment: 25% at this time, denies pain, \"just weak\" (22)  Current: 22: goal progressin%, denies pain, \"hard\"  4. Patient to increase l/s AROM to 75% globally to facilitate ADLS and functional mobility. Status at assessment: flexion fingers to mid shin, walks hands on thighs to arise , extension 50% deg , LSB 50% deg, RSB 50% (sx in R l/s) . Rot: R:25%, L: 40%   Current: 22: Flexion fingers to mid shin (able to rise without use of UE, reverse lordosis absent) extension 50% deg with pain , LSB 50% deg, RSB 50% (sx in L l/s) .  Rot: R: 100%, L: 100%   Goal d/c, not appropriate at this time d/t tissue approximation          PLAN  [x]  Upgrade activities as tolerated     [x]  Continue plan of care 2x/4 wks  []  Update interventions per flow sheet       []  Discharge due to:_  [x]  Other: See DARCY Allen Dino, PT 2022          Future Appointments   Date Time Provider Tessy Al   2022  8:30 AM Wallace Llamas, PT [facility]   2022 10:30 AM HR [facility] NURSE [facility] BS AMB
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
## 2022-07-07 ENCOUNTER — HOSPITAL ENCOUNTER (OUTPATIENT)
Dept: PHYSICAL THERAPY | Age: 57
Discharge: HOME | End: 2022-07-07
Payer: MEDICAID

## 2022-07-07 PROCEDURE — 97116 GAIT TRAINING THERAPY: CPT

## 2022-07-07 PROCEDURE — 97112 NEUROMUSCULAR REEDUCATION: CPT

## 2022-07-07 PROCEDURE — 97530 THERAPEUTIC ACTIVITIES: CPT

## 2022-07-07 PROCEDURE — 97110 THERAPEUTIC EXERCISES: CPT

## 2022-07-07 NOTE — PROGRESS NOTES
PT DAILY TREATMENT NOTE     Patient Name: Yesenia Luciano  Date:2022  : 1965  [x]  Patient  Verified  Payor: Aby Yu / Plan: [address] [place] [id number] / Product Type: Managed Care Medicaid /    In time: 7:45 am            Out time: 8:31 am  Total Treatment Time (min): 46  Visit #: 1 of 8-10    Treatment Area: Cervical muscle pain [M54.2]  Other low back pain [M54.59]    SUBJECTIVE  Pain Level (0-10 scale): 0  Any medication changes, allergies to medications, adverse drug reactions, diagnosis change, or new procedure performed?: [x] No    [] Yes (see summary sheet for update)  Subjective functional status/changes:   [] No changes reported  Patient reports no pain today.     OBJECTIVE  9 min Therapeutic Exercise:  [x] See flowsheet:   Rationale: increase ROM, increase strength, improve coordination, improve balance, and increase proprioception to improve the patients ability to progress to functional activities limited by  pain or other dysfunction     12 min Therapeutic Activity:  [x] See flowsheet:   Rationale: to improve functional activities, model real life movements and performance specific to the patients need with supervision to return the patient to their PLOF      16 min Neuromuscular Re-education:  [x] See flowsheet: added mountain climber   Rationale: exercises designed to improve and/or maintain balance, coordination, kinesthetic sense, posture, and/or proprioception for functional activities to improve the patients ability to function in daily life    9 min Gait Trainin-60 feet x 2 of following-  [x] March         [] Lateral                    [] Horizontal HT  [] Tandem         [x] Banded Lateral (RTB)    [] Vertical HT  [] Retrograde (RTB)    [x]  Monster (RTB)   [] Dual Task  [] Hurdles          [] Donna Elisabeth                    [] Ladder Drills  [] Heel Walk      [] Toe Walk   Rationale: improve safety and dynamic movement with household/community ambulation. X throughout treatment min Patient Education: [x] Review HEP     Other Objective/Functional Measures:         Pain Level (0-10 scale) post treatment: 0    ASSESSMENT/Changes in Function:   Patient req cueing to avoid TFL/quadriceps compensation with all resisted hip flexion attempts. Patient will continue to benefit from skilled PT services to modify and progress therapeutic interventions, address functional mobility deficits, address ROM deficits, address strength deficits, analyze and address soft tissue restrictions, analyze and cue movement patterns, analyze and modify body mechanics/ergonomics, assess and modify postural abnormalities, address imbalance/dizziness, and instruct in home and community integration to attain remaining goals. []  See Plan of Care  [x]  See progress note/recertification  []  See Discharge Summary         Progress towards goals / Updated goals: · Short Term Goals: To be accomplished in  2-3  weeks:  1. Patient will be compliant with initial HEP for sx management to address the above listed deficits. Current: 6/29/22: goal met; At least 3x/week, pt verbalizes importance of self management  2. Patient to report pain <=to 5 at worst to facilitate sleep, walking and ADL endurance. Current: 6/29/22: goal met; \"getting better\" 4/10 ave pain recently    · Long Term Goals: To be accomplished in  10-12  treatments:  1. Patient to be independent & compliant with HEP in preparation for D/C. Current: 6/29/22: goal progressing; At least 3x/week, pt verbalizes importance of self management  2. Patient to increase FOTO score to 86 indicating improved functional abilities and QOL. Status at assessment: 76  Current: 6/29/22: 63 (decreased from initial 76 d/t poor intake)  Goal d/c, not appropriate d/t poor intake score   3. Patient to increase c/s L rot to 60deg, and Arslan SB to 40deg to facilitate driving and indicate improved mm tension.    Status at assessment: flex: 1 finger to manubriub , ext full (tightness in ant neck mm), R lat flex: 32, L lat flex 32deg, R rot 80, L rot 46deg. Current: 22: goal progressing: Flex full, ext full pain-free, R lat flex: 33deg, L lat flex 38deg, R rot 68deg, L rot 62deg. 4. Patient able to perform a full bridge indicating improved core strength for l/s stability. Status at assessment: 25% at this time, denies pain, \"just weak\" (22)  Current: 22: goal progressin%, denies pain, \"hard\"  4. Patient to increase l/s AROM to 75% globally to facilitate ADLS and functional mobility. Status at assessment: flexion fingers to mid shin, walks hands on thighs to arise , extension 50% deg , LSB 50% deg, RSB 50% (sx in R l/s) . Rot: R:25%, L: 40%   Current: 22: Flexion fingers to mid shin (able to rise without use of UE, reverse lordosis absent) extension 50% deg with pain , LSB 50% deg, RSB 50% (sx in L l/s) .  Rot: R: 100%, L: 100%   Goal d/c, not appropriate at this time d/t tissue approximation          PLAN  [x]  Upgrade activities as tolerated     [x]  Continue plan of care 2x/4 wks  []  Update interventions per flow sheet       []  Discharge due to:_  []  Other:_    Claudia Sharp PTA 2022      Future Appointments   Date Time Provider Tessy Al   2022  8:00 AM Marga Andrade PTA [facility]   2022  9:15 AM [facility] [place] [facility]   2022  8:30 AM Shayy Moreau, PT [facility]   2022  7:45 AM Marga Andrade PTA [facility]   2022  8:30 AM Marga Andrade, PTA [facility]   2022  8:30 AM Shayy Moreau, PT [facility]   2022  8:30 AM Marga Andrade PTA [facility]   2022 10:30 AM [facility] NURSE [facility] BS AMB

## 2022-07-08 ENCOUNTER — HOSPITAL ENCOUNTER (OUTPATIENT)
Dept: PHYSICAL THERAPY | Age: 57
Discharge: HOME | End: 2022-07-08
Payer: MEDICAID

## 2022-07-08 PROCEDURE — 97116 GAIT TRAINING THERAPY: CPT

## 2022-07-08 PROCEDURE — 97110 THERAPEUTIC EXERCISES: CPT

## 2022-07-08 PROCEDURE — 97112 NEUROMUSCULAR REEDUCATION: CPT

## 2022-07-08 PROCEDURE — 97530 THERAPEUTIC ACTIVITIES: CPT

## 2022-07-08 NOTE — PROGRESS NOTES
PT DAILY TREATMENT NOTE     Patient Name: Rusty Anderson  Date:2022  : 1965  [x]  Patient  Verified  Payor: Romana Hanna / Plan: [address] [place] [id number] / Product Type: Managed Care Medicaid /    In time: 8:05 am            Out time: 8:53 am  Total Treatment Time (min): 48  Visit #: 2 of 8-10    Treatment Area: Cervical muscle pain [M54.2]  Other low back pain [M54.59]    SUBJECTIVE  Pain Level (0-10 scale): 0  Any medication changes, allergies to medications, adverse drug reactions, diagnosis change, or new procedure performed?: [x] No    [] Yes (see summary sheet for update)  Subjective functional status/changes:   [] No changes reported  Patient notes difficulty squatting, such as when picking up items from the floor.     OBJECTIVE  15 min Therapeutic Exercise:  [x] See flowsheet:   Rationale: increase ROM, increase strength, improve coordination, improve balance, and increase proprioception to improve the patients ability to progress to functional activities limited by  pain or other dysfunction     15 min Therapeutic Activity:  [x] See flowsheet: added partial squats   Rationale: to improve functional activities, model real life movements and performance specific to the patients need with supervision to return the patient to their PLOF      8 min Neuromuscular Re-education:  [x] See flowsheet: open books   Rationale: exercises designed to improve and/or maintain balance, coordination, kinesthetic sense, posture, and/or proprioception for functional activities to improve the patients ability to function in daily life    10 min Gait Trainin feet x 2 of following-  [x] March         [] Lateral                    [] Horizontal HT  [] Tandem         [x] Banded Lateral (RTB)    [] Vertical HT  [] Retrograde (RTB)    [x]  Monster (RTB)   [] Dual Task  [] Hurdles          [] Phyllistine Kyle                    [] Ladder Drills  [] Heel Walk      [] Toe Walk   Rationale: improve safety and dynamic movement with household/community ambulation. X throughout treatment min Patient Education: [x] Review HEP     Other Objective/Functional Measures:       Pain Level (0-10 scale) post treatment: 0    ASSESSMENT/Changes in Function:   Patient req additional cueing for proper form with all squat attempts, demonstrating 50 deg knee flexion with fatigue upon correct performance. Improved dynamic stability with gait training without evidence of LOB indicating improved LE strength. Patient will continue to benefit from skilled PT services to modify and progress therapeutic interventions, address functional mobility deficits, address ROM deficits, address strength deficits, analyze and address soft tissue restrictions, analyze and cue movement patterns, analyze and modify body mechanics/ergonomics, assess and modify postural abnormalities, address imbalance/dizziness, and instruct in home and community integration to attain remaining goals. []  See Plan of Care  [x]  See progress note/recertification  []  See Discharge Summary         Progress towards goals / Updated goals: · Short Term Goals: To be accomplished in  2-3  weeks:  1. Patient will be compliant with initial HEP for sx management to address the above listed deficits. Current: 6/29/22: goal met; At least 3x/week, pt verbalizes importance of self management  2. Patient to report pain <=to 5 at worst to facilitate sleep, walking and ADL endurance. Current: 6/29/22: goal met; \"getting better\" 4/10 ave pain recently    · Long Term Goals: To be accomplished in  10-12  treatments:  1. Patient to be independent & compliant with HEP in preparation for D/C. Current: 6/29/22: goal progressing; At least 3x/week, pt verbalizes importance of self management  2. Patient to increase FOTO score to 86 indicating improved functional abilities and QOL.    Status at assessment: 76  Current: 6/29/22: 63 (decreased from initial 76 d/t poor intake)  Goal d/c, not appropriate d/t poor intake score   3. Patient to increase c/s L rot to 60deg, and Arslan SB to 40deg to facilitate driving and indicate improved mm tension. Status at assessment: flex: 1 finger to manubriub , ext full (tightness in ant neck mm), R lat flex: 32, L lat flex 32deg, R rot 80, L rot 46deg. Current: 22: goal progressing: Flex full, ext full pain-free, R lat flex: 33deg, L lat flex 38deg, R rot 68deg, L rot 62deg. 4. Patient able to perform a full bridge indicating improved core strength for l/s stability. Status at assessment: 25% at this time, denies pain, \"just weak\" (22)  Current: 22: goal progressin%, denies pain, \"hard\"  4. Patient to increase l/s AROM to 75% globally to facilitate ADLS and functional mobility. Status at assessment: flexion fingers to mid shin, walks hands on thighs to arise , extension 50% deg , LSB 50% deg, RSB 50% (sx in R l/s) . Rot: R:25%, L: 40%   Current: 22: Flexion fingers to mid shin (able to rise without use of UE, reverse lordosis absent) extension 50% deg with pain , LSB 50% deg, RSB 50% (sx in L l/s) .  Rot: R: 100%, L: 100%   Goal d/c, not appropriate at this time d/t tissue approximation          PLAN  [x]  Upgrade activities as tolerated     [x]  Continue plan of care 2x/4 wks  []  Update interventions per flow sheet       []  Discharge due to:_  []  Other:_    Vi Wilson PTA 2022      Future Appointments   Date Time Provider Tessy Al   2022  9:15 AM [address] [place] [address] [facility]   2022  8:30 AM Kentrell Duarte, PT [facility]   2022  7:45 AM Candance Less, PTA [facility]   2022  8:30 AM Candance Less, PTA [facility]   2022  8:30 AM [place] Rhymes, PT [facility]   2022  8:30 AM Candance Less, PTA [facility]   2022 10:30 AM [facility] NURSE [facility] BS AMB

## 2022-07-11 ENCOUNTER — HOSPITAL ENCOUNTER (OUTPATIENT)
Dept: PHYSICAL THERAPY | Age: 57
Discharge: HOME | End: 2022-07-11
Payer: MEDICAID

## 2022-07-11 PROCEDURE — 97110 THERAPEUTIC EXERCISES: CPT

## 2022-07-11 PROCEDURE — 97530 THERAPEUTIC ACTIVITIES: CPT

## 2022-07-11 PROCEDURE — 97116 GAIT TRAINING THERAPY: CPT

## 2022-07-11 PROCEDURE — 97112 NEUROMUSCULAR REEDUCATION: CPT

## 2022-07-11 NOTE — PROGRESS NOTES
PT DAILY TREATMENT NOTE     Patient Name: Gemini Albright  Date:2022  : 1965  [x]  Patient  Verified  Payor: Mariia Ibarra / Plan: [address] [place] [id number] / Product Type: Managed Care Medicaid /    In time:9:19  Out time:10:02  Total Treatment Time (min): 43  Visit #: 3 of 8-10    Medicare/BCBS Only   Total Timed Codes (min):   1:1 Treatment Time:         Treatment Area: Cervical muscle pain [M54.2]  Other low back pain [M54.59]    SUBJECTIVE  Pain Level (0-10 scale): 4  Any medication changes, allergies to medications, adverse drug reactions, diagnosis change, or new procedure performed?: [x] No    [] Yes (see summary sheet for update)  Subjective functional status/changes:   [] No changes reported  \"I had a lot of achiness all over my body this past weekend, I think it was the rainy weather\"    OBJECTIVE    13 min Therapeutic Exercise:  [x] See flow sheet :   Rationale: increase ROM and increase strength to improve UE/LE strength/mobility and cervical/lumbar mobility to improve ease of ADLs, household chores, and gait. 11 min Therapeutic Activity:  [x]  See flow sheet :   Rationale: increase strength, improve coordination, improve balance, and increase proprioception to improve the patients ability to perform transfers, stair negotiation, functional overhead reach/lifts, and functional carries. Pt education:      9 min Neuromuscular Re-education:  [x]  See flow sheet :   Rationale: increase strength, improve coordination, improve balance and increase proprioception to improve the patients ability to perform functional tasks with improved abdominal brace utilization, improved hip/knee stability, improved scapular control, improved deep cervical flexor endurance, and decreased risk for falls.     10 min Gait Trainin feet x 2 of following-  [x] March            [] Lateral                   [] Horizontal HT  [] Tandem         [x] Banded Lateral     [] Vertical HT  [] Retrograde    [x] Banded Monster   [] Dual Task   Rationale: improve safety with household/community ambulation. With   [] TE   [] TA   [] neuro   [] other: Patient Education: [x] Review HEP    [] Progressed/Changed HEP based on:   [] positioning   [] body mechanics   [] transfers   [] heat/ice application    [] other:      Other Objective/Functional Measures: -Added resistance to goblet squat to target   -Verbal cuing to increase stance width with lat stepping    Pain Level (0-10 scale) post treatment: 0    ASSESSMENT/Changes in Function: Focus of today's treatment on controlled progression in resistance with her interventions. The patient requires verbal cuing to improve technique with stretching interventions to improve relaxation at end range. Overall, the patient is making progress towards therapy goals of decreased pain (noting decreased average pain level recently) and increased activity tolerance. Patient will continue to benefit from skilled PT services to modify and progress therapeutic interventions, address functional mobility deficits, address ROM deficits, address strength deficits, analyze and address soft tissue restrictions, analyze and cue movement patterns, analyze and modify body mechanics/ergonomics, assess and modify postural abnormalities and address imbalance/dizziness to attain remaining goals. []  See Plan of Care  []  See progress note/recertification  []  See Discharge Summary         Progress towards goals / Updated goals: · Short Term Goals: To be accomplished in  2-3  weeks:  1. Patient will be compliant with initial HEP for sx management to address the above listed deficits. Current: 6/29/22: goal met; At least 3x/week, pt verbalizes importance of self management  2. Patient to report pain <=to 5 at worst to facilitate sleep, walking and ADL endurance. Current: 6/29/22: goal met; \"getting better\" 4/10 ave pain recently     · Long Term Goals:  To be accomplished in  10-12  treatments:  1. Patient to be independent & compliant with HEP in preparation for D/C. Current: 22: goal progressing; At least 3x/week, pt verbalizes importance of self management  2. Patient to increase FOTO score to 86 indicating improved functional abilities and QOL. Status at assessment: 76  Current: 22: 63 (decreased from initial 76 d/t poor intake)  Goal d/c, not appropriate d/t poor intake score   3. Patient to increase c/s L rot to 60deg, and Arslan SB to 40deg to facilitate driving and indicate improved mm tension. Status at assessment: flex: 1 finger to manubriub , ext full (tightness in ant neck mm), R lat flex: 32, L lat flex 32deg, R rot 80, L rot 46deg. Current: 22: goal progressing: Flex full, ext full pain-free, R lat flex: 33deg, L lat flex 38deg, R rot 68deg, L rot 62deg. 4. Patient able to perform a full bridge indicating improved core strength for l/s stability. Status at assessment: 25% at this time, denies pain, \"just weak\" (22)  Current: 22: goal progressin%, denies pain, \"hard\"  4. Patient to increase l/s AROM to 75% globally to facilitate ADLS and functional mobility.   Status at assessment: flexion fingers to mid shin, walks hands on thighs to arise , extension 50% deg , LSB 50% deg, RSB 50% (sx in R l/s) .  Rot: R:25%, L: 40%   Current: 22: Flexion fingers to mid shin (able to rise without use of UE, reverse lordosis absent) extension 50% deg with pain , LSB 50% deg, RSB 50% (sx in L l/s) . Rot: R: 100%, L: 100%   Goal d/c, not appropriate at this time d/t tissue approximation      PLAN  [x]  Upgrade activities as tolerated     [x]  Continue plan of care  []  Update interventions per flow sheet       []  Discharge due to:_  []  Other:_      Jc Turner 2022  9:30 AM    Future Appointments   Date Time Provider Tessy Al   2022  8:30 AM Paulie Chavez PT [facility]   2022  7:45 AM Taj Mac Alvera Money [facility]   7/21/2022  8:30 AM Hudson Morning, PTA [facility]   7/26/2022  8:30 AM Acacia Suarez, PT [facility]   7/28/2022  8:30 AM Hudson Morning, PTA [facility]   9/13/2022 10:30 AM [facility] NURSE [facility] BS AMB

## 2022-07-13 ENCOUNTER — HOSPITAL ENCOUNTER (OUTPATIENT)
Dept: PHYSICAL THERAPY | Age: 57
Discharge: HOME | End: 2022-07-13
Payer: MEDICAID

## 2022-07-13 PROCEDURE — 97530 THERAPEUTIC ACTIVITIES: CPT

## 2022-07-13 PROCEDURE — 97116 GAIT TRAINING THERAPY: CPT

## 2022-07-13 PROCEDURE — 97110 THERAPEUTIC EXERCISES: CPT

## 2022-07-13 PROCEDURE — 97112 NEUROMUSCULAR REEDUCATION: CPT

## 2022-07-13 NOTE — PROGRESS NOTES
PT DAILY TREATMENT NOTE     Patient Name: Sunita Adame  Date:2022  : 1965  [x]  Patient  Verified  Payor: Yuliet Oquendo / Plan: 1 [id number] / Product Type: Managed Care Medicaid /    In time:  Out time: 923  Total Treatment Time (min): 55  Visit #: 4 of 8-10    Treatment Area: Cervical muscle pain [M54.2]  Other low back pain [M54.59]    SUBJECTIVE  Pain Level (0-10 scale): 0/10  Any medication changes, allergies to medications, adverse drug reactions, diagnosis change, or new procedure performed?: [x] No    [] Yes (see summary sheet for update)  Subjective functional status/changes:   [] No changes reported  \"I'm feeling really good this morning\". Reports mild muscular soreness in B thighs following previous PT sessions. OBJECTIVE    15 min Therapeutic Exercise:  [x] See flow sheet :   Rationale: increase ROM and increase strength to improve UE/LE strength/mobility and cervical/lumbar mobility to improve ease of ADLs, household chores, and gait. 15 min Therapeutic Activity:  [x]  See flow sheet :   Rationale: increase strength, improve coordination, improve balance, and increase proprioception to improve the patients ability to perform transfers, stair negotiation, functional overhead reach/lifts, and functional carries. 15 min Neuromuscular Re-education:  [x]  See flow sheet :   Rationale: increase strength, improve coordination, improve balance and increase proprioception to improve the patients ability to perform functional tasks with improved abdominal brace utilization, improved hip/knee stability, improved scapular control, improved deep cervical flexor endurance, and decreased risk for falls.     10 min Gait Trainin feet x 2 of following-  [] March            [x] Lunge Walks                  [] Horizontal HT  [] Tandem         [x] Banded Lateral     [] Vertical HT  [] Retrograde    [x] Banded Monster   [] Dual Task Rationale: improve safety with household/community ambulation. With   [] TE   [] TA   [] neuro   [] other: Patient Education: [x] Review HEP    [] Progressed/Changed HEP based on:   [] positioning   [] body mechanics   [] transfers   [] heat/ice application    [] other:      Other Objective/Functional Measures: Added:    High lunge walks    Standing Fire hydrants, donkey kicks with GTB   Step ups to 2\" + Airex, fwd/lateral  Progressions:    Incr to L3 on Nustep   GTB for hip abd with squat <> stand at plinth   Incr reps supine bridge     Pain Level (0-10 scale) post treatment: 0    ASSESSMENT/Changes in Function:   Pt tolerated progression of exercises today for hip/trunk stabilization with no adverse effects or pain. Demonstrating good I use of breathing technique throughout and good carryover of form/technique of all known exercises. Fire hydrants, donkey kicks, and step ups fwd/lateral initiated with cues to reduce UE reliance and improve SLS, pt req step down of foot between each rep. Cont to progress functional strengthening and endurance for ease with ADLs and activity tolerance. Patient will continue to benefit from skilled PT services to modify and progress therapeutic interventions, address functional mobility deficits, address ROM deficits, address strength deficits, analyze and address soft tissue restrictions, analyze and cue movement patterns, analyze and modify body mechanics/ergonomics, assess and modify postural abnormalities and address imbalance/dizziness to attain remaining goals. []  See Plan of Care  []  See progress note/recertification  []  See Discharge Summary         Progress towards goals / Updated goals: · Short Term Goals: To be accomplished in  2-3  weeks:  1. Patient will be compliant with initial HEP for sx management to address the above listed deficits. Current: 6/29/22: goal met; At least 3x/week, pt verbalizes importance of self management  2.  Patient to report pain <=to 5 at worst to facilitate sleep, walking and ADL endurance. Current: 22: goal met; \"getting better\" 4/10 ave pain recently     · Long Term Goals: To be accomplished in  10-12  treatments:  1. Patient to be independent & compliant with HEP in preparation for D/C. Current: 22: goal progressing; At least 3x/week, pt verbalizes importance of self management  2. Patient to increase c/s L rot to 60deg, and Arslan SB to 40deg to facilitate driving and indicate improved mm tension. Status at assessment: flex: 1 finger to manubriub , ext full (tightness in ant neck mm), R lat flex: 32, L lat flex 32deg, R rot 80, L rot 46deg. Current: 22: goal progressing: Flex full, ext full pain-free, R lat flex: 33deg, L lat flex 38deg, R rot 68deg, L rot 62deg. 3. Patient able to perform a full bridge indicating improved core strength for l/s stability. Status at assessment: 25% at this time, denies pain, \"just weak\" (22)  Current: 22: goal progressin% for 2x10 \"moderate\"  4. Patient will demonstrate 10 pain-free squats to target with OH lift of appropriate weight to facilitate proper body mechanics with ADLs. Current 22 goal progressing, demo good sit <> stand x10 without UE assist  5. Patient will demonstrate 20x B SLR >30deg with good TA engagement for safe bed mobility and to promote lumbar support.    Current 22 goal progressing, able to perform 2x10 B to 30d with good technique    PLAN  [x]  Upgrade activities as tolerated     [x]  Continue plan of care  []  Update interventions per flow sheet       []  Discharge due to:_  [x]  Other:_  consider D1/D2 chop/lifts at [facility] PT 2022  9:30 AM    Future Appointments   Date Time Provider Tessy Al   2022  8:30 AM Aman Salgado, PT [facility]   2022  7:45 AM Brigittekae Austin PTA [facility]   2022  8:30 AM Brigitte Austin PTA [facility]   2022  8:30 AM Aman Salgado PT [facility]   7/28/2022  8:30 AM Josy Molina PTA [facility]   9/13/2022 10:30 AM [facility] NURSE [facility] BS AMB

## 2022-07-19 ENCOUNTER — HOSPITAL ENCOUNTER (OUTPATIENT)
Dept: PHYSICAL THERAPY | Age: 57
Discharge: HOME | End: 2022-07-19
Payer: MEDICAID

## 2022-07-19 PROCEDURE — 97110 THERAPEUTIC EXERCISES: CPT

## 2022-07-19 PROCEDURE — 97112 NEUROMUSCULAR REEDUCATION: CPT

## 2022-07-19 PROCEDURE — 97116 GAIT TRAINING THERAPY: CPT

## 2022-07-19 PROCEDURE — 97530 THERAPEUTIC ACTIVITIES: CPT

## 2022-07-19 NOTE — PROGRESS NOTES
PT DAILY TREATMENT NOTE     Patient Name: Suzy Kruger  Date:2022  : 1965  [x]  Patient  Verified  Payor: Georgi Jerome / Plan: 1 [facility] 270 / Product Type: Managed Care Medicaid /    In time: 7:47 am      Out time: 8:29 am  Total Treatment Time (min): 42  Visit #: 5 of 8-10    Treatment Area: Cervical muscle pain [M54.2]  Other low back pain [M54.59]    SUBJECTIVE  Pain Level (0-10 scale): 0  Any medication changes, allergies to medications, adverse drug reactions, diagnosis change, or new procedure performed?: [x] No    [] Yes (see summary sheet for update)  Subjective functional status/changes:   [] No changes reported  \"I have been doing the exercises at home and really feeling better. \"    OBJECTIVE  8 min Therapeutic Exercise:  [x] See flow sheet:    Rationale: increase ROM and increase strength to improve UE/LE strength/mobility and cervical/lumbar mobility to improve ease of ADLs, household chores, and gait. 10 min Therapeutic Activity:  [x]  See flow sheet: added MARTHA buckley   Rationale: increase strength, improve coordination, improve balance, and increase proprioception to improve the patients ability to perform transfers, stair negotiation, functional overhead reach/lifts, and functional carries. 14 min Neuromuscular Re-education:  [x]  See flow sheet:   Rationale: increase strength, improve coordination, improve balance and increase proprioception to improve the patients ability to perform functional tasks with improved abdominal brace utilization, improved hip/knee stability, improved scapular control, improved deep cervical flexor endurance, and decreased risk for falls.     10 min Gait Trainin feet x 2 of following-  [] March            [x] Lunge Walks                  [] Horizontal HT  [] Tandem         [x] Banded Lateral     [] Vertical HT  [] Retrograde    [x] Banded Monster   [] Dual Task   Rationale: improve safety with household/community ambulation. With   [x] TE   [x] TA   [x] neuro   [] other: Patient Education: [x] Review HEP    [] Progressed/Changed HEP based on:   [] positioning   [] body mechanics   [] transfers   [] heat/ice application    [] other:      Other Objective/Functional Measures:      Pain Level (0-10 scale) post treatment: 0    ASSESSMENT/Changes in Function:   Patient demonstrates improved scapular setting with all resisted row attempts. Reduced compensated Trendelenburg gait when cognizant of TA draw. Patient will continue to benefit from skilled PT services to modify and progress therapeutic interventions, address functional mobility deficits, address ROM deficits, address strength deficits, analyze and address soft tissue restrictions, analyze and cue movement patterns, analyze and modify body mechanics/ergonomics, assess and modify postural abnormalities and address imbalance/dizziness to attain remaining goals. []  See Plan of Care  []  See progress note/recertification  []  See Discharge Summary         Progress towards goals / Updated goals: · Short Term Goals: To be accomplished in  2-3  weeks:  1. Patient will be compliant with initial HEP for sx management to address the above listed deficits. Current: 6/29/22: goal met; At least 3x/week, pt verbalizes importance of self management  2. Patient to report pain <=to 5 at worst to facilitate sleep, walking and ADL endurance. Current: 6/29/22: goal met; \"getting better\" 4/10 ave pain recently     · Long Term Goals: To be accomplished in  10-12  treatments:  1. Patient to be independent & compliant with HEP in preparation for D/C. Current: 6/29/22: goal progressing; At least 3x/week, pt verbalizes importance of self management  2. Patient to increase C/S (L) rot to 60 deg, and bilateral SB to 40 deg to facilitate driving and indicate improved mm tension.    Status at assessment: flexion 1 finger to manubrium, ext full (tightness in ant neck mm), (R) lat flex: 32, (L) lat flex 32 deg, (R) rot 80 deg, (L) rot 46 deg  Current: 22: goal progressing: Flex full, ext full pain-free, R lat flex: 33deg, L lat flex 38deg, R rot 68deg, L rot 62deg. 3. Patient able to perform a full bridge indicating improved core strength for l/s stability. Status at assessment: 25% at this time, denies pain, \"just weak\" (22)  Current: 22: goal progressin% for 2x10 \"moderate\"  4. Patient will demonstrate 10 pain-free squats to target with OH lift of appropriate weight to facilitate proper body mechanics with ADLs. Current 22 goal progressing, demo good sit <> stand x10 without UE assist  5. Patient will demonstrate 20x B SLR >30deg with good TA engagement for safe bed mobility and to promote lumbar support.    Current 22 goal progressing, able to perform 2x10 B to 30d with good technique    PLAN  [x]  Upgrade activities as tolerated     [x]  Continue plan of care  []  Update interventions per flow sheet       []  Discharge due to:_  []  Other:_    Lucy Velazquez PTA 2022    Future Appointments   Date Time Provider Tessy Al   2022  8:30 AM Nadiya Bolton PTA [facility]   2022  8:30 AM Zakia Nolasco PT [facility]   2022  8:30 AM Nadiya Bolton PTA [facility]   2022 10:30 AM [facility] NURSE [facility] BS AMB

## 2022-07-21 ENCOUNTER — HOSPITAL ENCOUNTER (OUTPATIENT)
Dept: PHYSICAL THERAPY | Age: 57
Discharge: HOME | End: 2022-07-21
Payer: MEDICAID

## 2022-07-21 PROCEDURE — 97116 GAIT TRAINING THERAPY: CPT

## 2022-07-21 PROCEDURE — 97530 THERAPEUTIC ACTIVITIES: CPT

## 2022-07-21 PROCEDURE — 97112 NEUROMUSCULAR REEDUCATION: CPT

## 2022-07-21 PROCEDURE — 97110 THERAPEUTIC EXERCISES: CPT

## 2022-07-21 NOTE — PROGRESS NOTES
PT DAILY TREATMENT NOTE     Patient Name: Ifrah Harris  Date:2022  : 1965  [x]  Patient  Verified  Payor: Yvonne Burnett / Plan: 1 [place] [id number] / Product Type: Managed Care Medicaid /    In time: 8:30 am          Out time: 9:17 am  Total Treatment Time (min): 47  Visit #: 6 of 8-10    Treatment Area: Cervical muscle pain [M54.2]  Other low back pain [M54.59]    SUBJECTIVE  Pain Level (0-10 scale): 0  Any medication changes, allergies to medications, adverse drug reactions, diagnosis change, or new procedure performed?: [x] No    [] Yes (see summary sheet for update)  Subjective functional status/changes:   [] No changes reported  \"I am just tired today. \"    OBJECTIVE  10 min Therapeutic Exercise:  [x] See flow sheet:    Rationale: increase ROM and increase strength to improve UE/LE strength/mobility and cervical/lumbar mobility to improve ease of ADLs, household chores, and gait. 10 min Therapeutic Activity:  [x]  See flow sheet:    Rationale: increase strength, improve coordination, improve balance, and increase proprioception to improve the patients ability to perform transfers, stair negotiation, functional overhead reach/lifts, and functional carries. 12 min Neuromuscular Re-education:  [x]  See flow sheet:    Rationale: increase strength, improve coordination, improve balance and increase proprioception to improve the patients ability to perform functional tasks with improved abdominal brace utilization, improved hip/knee stability, improved scapular control, improved deep cervical flexor endurance, and decreased risk for falls.     15 min Gait Trainin feet x 2 of following-  [] March            [x] Lunge Walks                  [] Horizontal HT  [] Tandem         [x] Banded Lateral     [] Vertical HT  [] Retrograde    [x] Banded Monster   [x] Weighted carry with two- 5# hand weights (suitcase position)   Rationale: improve safety with household/community ambulation. With   [x] TE   [x] TA   [x] neuro   [] other: Patient Education: [x] Review HEP    [] Progressed/Changed HEP based on:   [] positioning   [] body mechanics   [] transfers   [] heat/ice application    [] other:      Other Objective/Functional Measures:    Pain Level (0-10 scale) post treatment: 0    ASSESSMENT/Changes in Function:   Patient req min cueing for proper scapular setting during weighted carries, initially demonstrating scapular elevation at rest.    Patient will continue to benefit from skilled PT services to modify and progress therapeutic interventions, address functional mobility deficits, address ROM deficits, address strength deficits, analyze and address soft tissue restrictions, analyze and cue movement patterns, analyze and modify body mechanics/ergonomics, assess and modify postural abnormalities and address imbalance/dizziness to attain remaining goals. []  See Plan of Care  []  See progress note/recertification  []  See Discharge Summary         Progress towards goals / Updated goals:  Short Term Goals: To be accomplished in  2-3  weeks:  1. Patient will be compliant with initial HEP for sx management to address the above listed deficits. Current: 6/29/22: goal met; At least 3x/week, pt verbalizes importance of self management  2. Patient to report pain <=to 5 at worst to facilitate sleep, walking and ADL endurance. Current: 6/29/22: goal met; \"getting better\" 4/10 ave pain recently     Long Term Goals: To be accomplished in  10-12  treatments:  Patient to be independent & compliant with HEP in preparation for D/C. Current: 6/29/22: goal progressing; At least 3x/week, pt verbalizes importance of self management  2. Patient to increase C/S (L) rot to 60 deg, and bilateral SB to 40 deg to facilitate driving and indicate improved mm tension.    Status at assessment: flexion 1 finger to manubrium, ext full (tightness in ant neck mm), (R) lat flex: 32, (L) lat flex 32 deg, (R) rot 80 deg, (L) rot 46 deg  Current: 22: goal progressing: Flex full, ext full pain-free, R lat flex: 33deg, L lat flex 38deg, R rot 68deg, L rot 62deg. 3. Patient able to perform a full bridge indicating improved core strength for l/s stability. Status at assessment: 25% at this time, denies pain, \"just weak\" (22)  Current: 22: goal progressin% for 2x10 \"moderate\"  4. Patient will demonstrate 10 pain-free squats to target with OH lift of appropriate weight to facilitate proper body mechanics with ADLs. Current 22 goal progressing, demo good sit <> stand x10 without UE assist  5. Patient will demonstrate 20x B SLR >30deg with good TA engagement for safe bed mobility and to promote lumbar support.    Current 22 goal progressing, able to perform 2x10 B to 30d with good technique    PLAN  [x]  Upgrade activities as tolerated     [x]  Continue plan of care  []  Update interventions per flow sheet       []  Discharge due to:_  []  Other:_    Ira Georges PTA 2022    Future Appointments   Date Time Provider Tessy Al   2022  8:30 AM Dannie Burt, PT [facility]   2022  8:30 AM Erin Brooks PTA [facility]   2022 10[facility]

## 2022-07-26 ENCOUNTER — HOSPITAL ENCOUNTER (OUTPATIENT)
Dept: PHYSICAL THERAPY | Age: 57
Discharge: HOME | End: 2022-07-26
Payer: MEDICAID

## 2022-07-26 PROCEDURE — 97112 NEUROMUSCULAR REEDUCATION: CPT

## 2022-07-26 PROCEDURE — 97530 THERAPEUTIC ACTIVITIES: CPT

## 2022-07-26 PROCEDURE — 97116 GAIT TRAINING THERAPY: CPT

## 2022-07-26 NOTE — PROGRESS NOTES
PT DAILY TREATMENT NOTE     Patient Name: Suzy Kruger  Date:2022  : 1965  [x]  Patient  Verified  Payor: Georgi Jerome / Plan: 1 [facility] 270 / Product Type: Managed Care Medicaid /    In time: 169         Out time: 810  Total Treatment Time (min): 49  Visit #: 7 of 8-10    Treatment Area: Cervical muscle pain [M54.2]  Other low back pain [M54.59]    SUBJECTIVE  Pain Level (0-10 scale): 0  Any medication changes, allergies to medications, adverse drug reactions, diagnosis change, or new procedure performed?: [x] No    [] Yes (see summary sheet for update)  Subjective functional status/changes:   [] No changes reported  \"I am just tired today. \" Pt reports having a busy weekend. OBJECTIVE  10 min Therapeutic Exercise:  [x] See flow sheet:    Rationale: increase ROM and increase strength to improve UE/LE strength/mobility and cervical/lumbar mobility to improve ease of ADLs, household chores, and gait. 12 min Therapeutic Activity:  [x]  See flow sheet:    Rationale: increase strength, improve coordination, improve balance, and increase proprioception to improve the patients ability to perform transfers, stair negotiation, functional overhead reach/lifts, and functional carries. 12 min Neuromuscular Re-education:  [x]  See flow sheet:    Rationale: increase strength, improve coordination, improve balance and increase proprioception to improve the patients ability to perform functional tasks with improved abdominal brace utilization, improved hip/knee stability, improved scapular control, improved deep cervical flexor endurance, and decreased risk for falls.     15 min Gait Trainin feet x 2 of following-  [] March            [x] Lunge Walks                  [] Horizontal HT  [] Tandem         [x] Banded Lateral     [] Vertical HT  [] Retrograde    [x] Banded Monster   [x] Weighted carry with two- 5# hand weights (suitcase position) Rationale: improve safety with household/community ambulation. With   [x] TE   [] TA   [] neuro   [] other: Patient Education: [x] Review HEP    Reassess pt goals  Lifestyle choices for improving strength, endurance (walking in parking lots, choosing to take stairs)  [] Progressed/Changed HEP based on:   [] positioning   [] body mechanics   [] transfers   [] heat/ice application    [] other:      Other Objective/Functional Measures:  Reassess for PN/DC:   Subjective % improvement: NV  Pain: Worst: 0/10 Best: 0/10  Av/10   Improvements: \"stamina, endurance, energy, taking the stairs now\"  Deficits:  \"walking, LE strength\"   FOTO: NV  Posture: increased l/s lordosis, improved Cervical posture, slightly rounded shoulders B  L/S AROM: Flexion fingers to floor (able to rise without use of UE) extension 100%, B SB to knee, no Sx. C/S AROM: flex/ext WNL pain-free, B lat flex: 65 deg, B rot 75 deg, all pain free. Strength: Hip flexion R: 5/5, L: 4+/5  Palpation reveals:  Denies tenderness at [place]  UT, paraspinals, QL. Flexibility: HS length 180deg B  SLR: x20 B >30 deg \"easy\"     Added:   Floor to waist lifts 15#  Dead bug   Shoulder taps at plinth    Pain Level (0-10 scale) post treatment: 0    ASSESSMENT/Changes in Function:   Pt demonstrates excellent progression, meeting all objective PT goals and subjectively improved QOL and decr pain. Plan to D/C with final HEP NV for self-management of car. Pt reports preference for functional exercise for LE strength and endurance.  Waist <> floor lifts     Patient will continue to benefit from skilled PT services to modify and progress therapeutic interventions, address functional mobility deficits, address ROM deficits, address strength deficits, analyze and address soft tissue restrictions, analyze and cue movement patterns, analyze and modify body mechanics/ergonomics, assess and modify postural abnormalities and address imbalance/dizziness to attain remaining goals.     []  See Plan of Care  []  See progress note/recertification  []  See Discharge Summary         Progress towards goals / Updated goals:  Short Term Goals: To be accomplished in  2-3  weeks:  1. Patient will be compliant with initial HEP for sx management to address the above listed deficits. Current: 6/29/22: goal met; At least 3x/week, pt verbalizes importance of self management  2. Patient to report pain <=to 5 at worst to facilitate sleep, walking and ADL endurance. Current: 6/29/22: goal met; \"getting better\" 4/10 ave pain recently     Long Term Goals: To be accomplished in  10-12  treatments:  Patient to be independent & compliant with HEP in preparation for D/C. Current 7/26/22 pt reports \"every other day\"  2. Patient to increase C/S (L) rot to 60 deg, and bilateral SB to 40 deg to facilitate driving and indicate improved mm tension. Status at assessment 6/29/22: goal progressing: Flex full, ext full pain-free, R lat flex: 33deg, L lat flex 38deg, R rot 68deg, L rot 62deg. Current 7/26/22: goal met B lat flex: 65 deg, B rot 75 deg, all pain free. 3. Patient able to perform a full bridge indicating improved core strength for l/s stability. Status at assessment: 25% at this time, denies pain, \"just weak\" (6/20/22)  Current 7/26/22 goal met, able to perform full bridge 2x10  4. Patient will demonstrate 10 pain-free squats to target with OH lift of appropriate weight to facilitate proper body mechanics with ADLs. Current 7/13/22 goal progressing, demo good sit <> stand x10 without UE assist  Current 7/26/22 goal met, 2x 10 squat to low plinth with 4# OH press   5. Patient will demonstrate 20x B SLR >30deg with good TA engagement for safe bed mobility and to promote lumbar support. Current 7/26/22 goal met, able to perform 20x with good conc/ecc control >30d.     PLAN  [x]  Upgrade activities as tolerated     [x]  Continue plan of care  []  Update interventions per flow sheet       [] Discharge due to:_  [x]  Other:_ Plan for DC with final HEP GRICELDA Sun, PT 7/26/2022    Future Appointments   Date Time Provider Tessy Al   7/26/2022  8:30 AM Wallace Llamas, PT [facility]   7/28/2022  8:30 AM Shannon Miller PTA [facility]   9/13/2022 10:30 AM [facility] NURSE [facility] BS AMB

## 2022-07-28 ENCOUNTER — HOSPITAL ENCOUNTER (OUTPATIENT)
Dept: PHYSICAL THERAPY | Age: 57
Discharge: HOME | End: 2022-07-28
Payer: MEDICAID

## 2022-07-28 PROCEDURE — 97112 NEUROMUSCULAR REEDUCATION: CPT

## 2022-07-28 PROCEDURE — 97110 THERAPEUTIC EXERCISES: CPT

## 2022-07-28 PROCEDURE — 97530 THERAPEUTIC ACTIVITIES: CPT

## 2022-07-28 PROCEDURE — 97116 GAIT TRAINING THERAPY: CPT

## 2022-07-28 NOTE — PROGRESS NOTES
PT DAILY TREATMENT NOTE     Patient Name: Keven Garcia  Date:2022  : 1965  [x]  Patient  Verified  Payor: [address] [place],[address]D / Plan: 1 [place] [id number] / Product Type: Managed Care Medicaid /    In time: 8:31 am           Out time: 9:20 am  Total Treatment Time (min): 49  Visit #: 8 of 8-10    Treatment Area: Cervical muscle pain [M54.2]  Other low back pain [M54.59]    SUBJECTIVE  Pain Level (0-10 scale): 0  Any medication changes, allergies to medications, adverse drug reactions, diagnosis change, or new procedure performed?: [x] No    [] Yes (see summary sheet for update)  Subjective functional status/changes:   [] No changes reported  Patient reports 100% improvement since beginning treatment. She states she is now able to exercise and work without pain. OBJECTIVE  15 min Therapeutic Exercise:  [x] See flow sheet: reassessment   Rationale: increase ROM and increase strength to improve UE/LE strength/mobility and cervical/lumbar mobility to improve ease of ADLs, household chores, and gait. 12 min Therapeutic Activity:  [x]  See flow sheet:    Rationale: increase strength, improve coordination, improve balance, and increase proprioception to improve the patients ability to perform transfers, stair negotiation, functional overhead reach/lifts, and functional carries. 10 min Neuromuscular Re-education:  [x]  See flow sheet:    Rationale: increase strength, improve coordination, improve balance and increase proprioception to improve the patients ability to perform functional tasks with improved abdominal brace utilization, improved hip/knee stability, improved scapular control, improved deep cervical flexor endurance, and decreased risk for falls.     12 min Gait Trainin feet x 2 of following-  [] March            [x] Lung Walks                  [] Horizontal HT  [] Tandem         [x] Banded Lateral     [] Vertical HT  [] Retrograde    [x] Banded Monster   [x] Weighted carry with two- 5# hand weights (suitcase position)   Rationale: improve safety with household/community ambulation. With   [x] TE   [] TA   [] neuro   [] other: Patient Education: [x] Review HEP for D/C - all questions answered     Other Objective/Functional Measures:    Subjective % improvement: 100%  Pain: Worst: 0/10 Best: 0/10  Av/10   Improvements: \"stamina, endurance, energy, taking the stairs now\"  Deficits:  \"walking, LE strength\"   FOTO: 68/100  Posture: increased l/s lordosis, improved Cervical posture, slightly rounded shoulders B  L/S AROM: Flexion fingers to floor (able to rise without use of UE) extension 100%, B SB to knee, no Sx. C/S AROM: flex/ext WNL pain-free, B lat flex: 65 deg, B rot 75 deg, all pain free. Strength: Hip flexion R: 5/5, L: 4+/5  Palpation reveals:  Denies tenderness at [place]  UT, paraspinals, QL. Flexibility: HS length 180deg B  SLR: x20 B >30 deg \"easy\"     Added:   Floor to waist lifts 15#  Dead bug   Shoulder taps at plinth    Pain Level (0-10 scale) post treatment: 0    ASSESSMENT/Changes in Function:   See D/C. Patient will continue to benefit from skilled PT services to modify and progress therapeutic interventions, address functional mobility deficits, address ROM deficits, address strength deficits, analyze and address soft tissue restrictions, analyze and cue movement patterns, analyze and modify body mechanics/ergonomics, assess and modify postural abnormalities and address imbalance/dizziness to attain remaining goals. [x]  See Discharge Summary         Progress towards goals / Updated goals:    Patient to be independent & compliant with HEP in preparation for D/C. Current 22 pt reports \"every other day\"  2. Patient to increase C/S (L) rot to 60 deg, and bilateral SB to 40 deg to facilitate driving and indicate improved mm tension.    Status at assessment 22: goal progressing: Flex full, ext full pain-free, R lat flex: 33deg, L lat flex 38deg, R rot 68deg, L rot 62deg. Current 7/26/22: goal met B lat flex: 65 deg, B rot 75 deg, all pain free. 3. Patient able to perform a full bridge indicating improved core strength for l/s stability. Status at assessment: 25% at this time, denies pain, \"just weak\" (6/20/22)  Current 7/26/22 goal met, able to perform full bridge 2x10  4. Patient will demonstrate 10 pain-free squats to target with OH lift of appropriate weight to facilitate proper body mechanics with ADLs. Current 7/13/22 goal progressing, demo good sit <> stand x10 without UE assist  Current 7/26/22 goal met, 2x 10 squat to low plinth with 4# OH press   5. Patient will demonstrate 20x B SLR >30deg with good TA engagement for safe bed mobility and to promote lumbar support. Current 7/26/22 goal met, able to perform 20x with good conc/ecc control >30d.     PLAN    [x]  Discharge due to: program complete; patient notes resolution of sxs    Suzie Negro, PTA 7/28/2022    Future Appointments   Date Time Provider Tessy Al   9/13/2022 10:30 AM HR [facility] NURSE [facility] BS AMB

## 2022-07-28 NOTE — PROGRESS NOTES
Physical Therapy Discharge Instructions      [facility] - [address]  [phone] fax      Patient: Ryan Jernigan  : 1965      Continue Home Exercise Program 1 times per day:      Continue with    [x] Ice  as needed     [] Heat           Follow up with MD:     [] Upon completion of therapy     [x] As needed      Recommendations:     [x]   Return to activity with home program    []   Return to activity with the following modifications:       []Post Rehab Program    []Join Independent aquatic program     []Return to/join local gym      Additional Comments: It has been such a pleasure working with you! I hope you stay strong, flexible, and healthy. Feel free to contact us regarding any questions you may have in the future.       Farhad Valentine, PTA  2022
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
## 2025-04-15 ENCOUNTER — TELEPHONE (OUTPATIENT)
Age: 60
End: 2025-04-15

## 2025-04-22 ENCOUNTER — TELEPHONE (OUTPATIENT)
Age: 60
End: 2025-04-22

## 2025-05-07 NOTE — TELEPHONE ENCOUNTER
Tried calling pt to schedule colonoscopy. Pt was not available. Left message with man that answered to have her call Dr. Wise's office.     Tried the 2nd number, it is not in service.

## 2025-06-27 ENCOUNTER — TELEPHONE (OUTPATIENT)
Age: 60
End: 2025-06-27